# Patient Record
Sex: FEMALE | Race: WHITE | Employment: STUDENT | URBAN - METROPOLITAN AREA
[De-identification: names, ages, dates, MRNs, and addresses within clinical notes are randomized per-mention and may not be internally consistent; named-entity substitution may affect disease eponyms.]

---
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
## 2020-07-30 LAB — PAP-ABSTRACT: NORMAL

## 2021-12-14 NOTE — PROGRESS NOTES
SUBJECTIVE:                                                   Deepa Carlos is a 21 year old female who presents to clinic today for the following health issue(s):  Patient presents with:  Other: BC        HPI:  Patient here today to discuss birth control options and potential side effects.  We have not seen her since .  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  She is engaged and is planning a  wedding.  She is currently taking Chari and spironolactone.  She notes that she has a decrease in libido as well as vaginal dryness leading to dyspareunia.  She had a normal Pap smear in .    She is questioning whether she should go off of her pills and see if her perceived side effects resolve.    Patient's last menstrual period was 11/15/2021..     Patient is sexually active, .  Using oral contraceptives for contraception.    reports that she has never smoked. She has never used smokeless tobacco.    STD testing offered?  Declined    Health maintenance updated:  yes    Today's PHQ-2 Score:   PHQ-2 (  [employer]) 12/15/2021   Q1: Little interest or pleasure in doing things 0   Q2: Feeling down, depressed or hopeless 0   PHQ-2 Score 0     Today's PHQ-9 Score:   PHQ-9 SCORE 2017   PHQ-9 Total Score 6     Today's RODY-7 Score:   RODY-7 SCORE 2017   Total Score 17       Problem list and histories reviewed & adjusted, as indicated.  Additional history: as documented.    Patient Active Problem List   Diagnosis     Chronic migraine without aura without status migrainosus, not intractable     Past Surgical History:   Procedure Laterality Date     NO HISTORY OF SURGERY        Social History     Tobacco Use     Smoking status: Never Smoker     Smokeless tobacco: Never Used   Substance Use Topics     Alcohol use: No     Alcohol/week: 0.0 standard drinks      Problem (# of Occurrences) Relation (Name,Age of Onset)    Breast Cancer (1) Paternal Grandmother    Coronary Artery  "Disease (1) Paternal Grandfather       Negative family history of: Diabetes, Colon Cancer            Current Outpatient Medications   Medication Sig     drospirenone-ethinyl estradiol (JES) 3-0.02 MG per tablet Take 1 tablet by mouth daily     spironolactone (ALDACTONE) 25 MG tablet Take 25 mg by mouth     FLUoxetine (PROZAC) 10 MG capsule      No current facility-administered medications for this visit.     No Known Allergies    ROS:  12 point review of systems negative other than symptoms noted below or in the HPI.  No urinary frequency or dysuria, bladder or kidney problems      OBJECTIVE:     /64   Pulse 64   Ht 1.676 m (5' 6\")   Wt 73 kg (161 lb)   LMP 11/15/2021   BMI 25.99 kg/m    Body mass index is 25.99 kg/m .    Exam:  Constitutional:  Appearance: Well nourished, well developed alert, in no acute distress  Psychiatric:  Mentation appears normal and affect normal/bright.     In-Clinic Test Results:  No results found for this or any previous visit (from the past 24 hour(s)).    ASSESSMENT/PLAN:                                                        ICD-10-CM    1. General counseling and advice on contraceptive management  Z30.09        There are no Patient Instructions on file for this visit.    21-year-old female with vaginal dryness and lack of libido likely secondary to her birth control.  We gave her the option of going off of her birth control for a few months and seeing what her side effects do versus trying a lower dose pill or NuvaRing.  She will think about these options and call if she wants to switch methods.  We have asked her to call if she goes off her pills and does not have a spontaneous cycle in 3 to 4 months.    (10 minutes was spent on the date of the encounter doing chart review, review of outside records, review and interpretation of pertinent test results, history and exam, documentation, patient counseling, and further activities as noted above.)      DWAIN Clayton " CNP  [facility] [place]

## 2021-12-15 ENCOUNTER — OFFICE VISIT (OUTPATIENT)
Dept: OBGYN | Facility: CLINIC | Age: 21
End: 2021-12-15
Payer: COMMERCIAL

## 2021-12-15 VITALS
HEIGHT: 66 IN | SYSTOLIC BLOOD PRESSURE: 118 MMHG | WEIGHT: 161 LBS | BODY MASS INDEX: 25.88 KG/M2 | DIASTOLIC BLOOD PRESSURE: 64 MMHG | HEART RATE: 64 BPM

## 2021-12-15 DIAGNOSIS — Z30.09 GENERAL COUNSELING AND ADVICE ON CONTRACEPTIVE MANAGEMENT: Primary | ICD-10-CM

## 2021-12-15 PROCEDURE — 99202 OFFICE O/P NEW SF 15 MIN: CPT | Performed by: NURSE PRACTITIONER

## 2021-12-15 RX ORDER — SPIRONOLACTONE 25 MG/1
25 TABLET ORAL
COMMUNITY
Start: 2021-02-18

## 2021-12-15 ASSESSMENT — MIFFLIN-ST. JEOR: SCORE: 1507.04

## 2022-01-29 ENCOUNTER — HEALTH MAINTENANCE LETTER (OUTPATIENT)
Age: 22
End: 2022-01-29

## 2022-03-22 DIAGNOSIS — Z78.9 USES CONTRACEPTION: ICD-10-CM

## 2022-03-22 RX ORDER — NORETHINDRONE ACETATE AND ETHINYL ESTRADIOL 1; 20 MG/1; UG/1
TABLET ORAL
Qty: 84 TABLET | Refills: 0 | Status: SHIPPED | OUTPATIENT
Start: 2022-03-22

## 2022-03-22 NOTE — TELEPHONE ENCOUNTER
"Requested Prescriptions   Pending Prescriptions Disp Refills     JUNEL 1/20 1-20 MG-MCG tablet [Pharmacy Med Name: JUNEL 1 MG-20 MCG TABLET] 84 tablet 0     Sig: TAKE 1 TABLET BY MOUTH EVERY DAY       Contraceptives Protocol Passed - 3/22/2022 12:15 AM        Passed - Patient is not a current smoker if age is 35 or older        Passed - Recent (12 mo) or future (30 days) visit within the authorizing provider's specialty     Patient has had an office visit with the authorizing provider or a provider within the authorizing providers department within the previous 12 mos or has a future within next 30 days. See \"Patient Info\" tab in inbasket, or \"Choose Columns\" in Meds & Orders section of the refill encounter.              Passed - Medication is active on med list        Passed - No active pregnancy on record        Passed - No positive pregnancy test in past 12 months           Prescription approved per [facility] Refill Protocol.  Minerva Ricardo RN on 3/22/2022 at 8:47 AM    "

## 2022-03-29 ENCOUNTER — TRANSFERRED RECORDS (OUTPATIENT)
Dept: HEALTH INFORMATION MANAGEMENT | Facility: CLINIC | Age: 22
End: 2022-03-29

## 2022-03-29 LAB
C TRACH DNA SPEC QL PROBE+SIG AMP: NEGATIVE
SPECIMEN DESCRIPTION: NORMAL

## 2022-09-11 ENCOUNTER — HEALTH MAINTENANCE LETTER (OUTPATIENT)
Age: 22
End: 2022-09-11

## 2023-05-06 ENCOUNTER — HEALTH MAINTENANCE LETTER (OUTPATIENT)
Age: 23
End: 2023-05-06

## 2024-07-13 ENCOUNTER — HEALTH MAINTENANCE LETTER (OUTPATIENT)
Age: 24
End: 2024-07-13